# Patient Record
Sex: FEMALE | Race: WHITE | HISPANIC OR LATINO | URBAN - METROPOLITAN AREA
[De-identification: names, ages, dates, MRNs, and addresses within clinical notes are randomized per-mention and may not be internally consistent; named-entity substitution may affect disease eponyms.]

---

## 2021-02-22 ENCOUNTER — APPOINTMENT (RX ONLY)
Dept: URBAN - METROPOLITAN AREA CLINIC 316 | Facility: CLINIC | Age: 26
Setting detail: DERMATOLOGY
End: 2021-02-22

## 2021-02-22 VITALS — TEMPERATURE: 98.5 F

## 2021-02-22 DIAGNOSIS — L60.1 ONYCHOLYSIS: ICD-10-CM | Status: UNCHANGED

## 2021-02-22 DIAGNOSIS — B35.1 TINEA UNGUIUM: ICD-10-CM | Status: INADEQUATELY CONTROLLED

## 2021-02-22 PROBLEM — D23.61 OTHER BENIGN NEOPLASM OF SKIN OF RIGHT UPPER LIMB, INCLUDING SHOULDER: Status: ACTIVE | Noted: 2021-02-22

## 2021-02-22 PROCEDURE — 99203 OFFICE O/P NEW LOW 30 MIN: CPT

## 2021-02-22 PROCEDURE — ? COUNSELING

## 2021-02-22 PROCEDURE — ? PRESCRIPTION

## 2021-02-22 RX ORDER — CICLOPIROX 80 MG/ML
1 APPLICATION SOLUTION TOPICAL QHS
Qty: 1 | Refills: 2 | Status: CANCELLED | COMMUNITY
Start: 2021-02-22

## 2021-02-22 RX ADMIN — CICLOPIROX 1 APPLICATION: 80 SOLUTION TOPICAL at 00:00

## 2021-02-22 ASSESSMENT — LOCATION ZONE DERM: LOCATION ZONE: TOENAIL

## 2021-02-22 ASSESSMENT — LOCATION SIMPLE DESCRIPTION DERM
LOCATION SIMPLE: LEFT GREAT TOE
LOCATION SIMPLE: LEFT 4TH TOE

## 2021-02-22 ASSESSMENT — LOCATION DETAILED DESCRIPTION DERM
LOCATION DETAILED: LEFT 4TH TOENAIL
LOCATION DETAILED: LEFT GREAT TOENAIL

## 2021-02-25 RX ORDER — CICLOPIROX 80 MG/ML
1 APPLICATION SOLUTION TOPICAL QHS
Qty: 1 | Refills: 2 | Status: ERX

## 2025-07-06 NOTE — HPI: EVALUATION OF SKIN LESION(S)
Any severe pain, any numbness of your pelvis or legs, any weakness in your legs, any uncontrolled abdominal pain nausea or vomiting, go to the emergency room immediately for further evaluation  
What Type Of Note Output Would You Prefer (Optional)?: Bullet Format
Hpi Title: Evaluation of a Skin Lesion